# Patient Record
(demographics unavailable — no encounter records)

---

## 2024-10-21 NOTE — ASSESSMENT
[FreeTextEntry1] : 9-year-old male, left small finger Salter-Dallas type fracture of the proximal phalanx  -He is here with his mother today we discussed his injury in detail.  We discussed how it involves the physis.  I do not see the fracture on x-ray today however they have films on her phone which we are not able to transfer over there is what appears to be a Salter-Dallas II type fracture of the proximal phalanx.  This is consistent with his injury and his presentation on exam.  We discussed how physeal fractures require immobilization.  Given the minimal displacement I recommended full-time splinting, hand therapy consult placed for same-day splinting today.  Small finger to be buddied with the ring finger to improve abduction deformity.  He is a football and , I discussed the importance of avoiding reinjury as this could cause potential growth and physeal abnormalities.  Mom is understanding of plan.  They are in to follow-up next week and we will reevaluate.

## 2024-10-21 NOTE — HISTORY OF PRESENT ILLNESS
[FreeTextEntry1] : 9-year-old male presenting for evaluation left small finger injury.  He jammed it playing basketball on over the weekend.  They went to an urgent care there was concern for a proximal phalanx Salter-Dallas physeal fracture.  He has been splinted since the event.  He is here with his mother today who acts as historian.  No prior injury.

## 2024-10-21 NOTE — PHYSICAL EXAM
[de-identified] : Left small finger abducted with ecchymosis and bruising.  Tenderness over the proximal phalanx physis.  FDS FDP firing.  Unable to make a full composite fist secondary to pain and stiffness.  Remainder of the hand and digits without abnormality.  Skin intact.  No nailbed injury. [de-identified] : Three-view x-ray left small finger including AP, lateral, oblique, these were taken today and personally reviewed the demonstrate abduction deformity of the small finger, no obvious fracture, likely Salter-Dallas I type fracture

## 2024-10-29 NOTE — HISTORY OF PRESENT ILLNESS
[FreeTextEntry1] : 9-year-old male presenting for evaluation left small finger injury.  He has been doing well, he has been wearing his splint full-time other than to shower.  He is here with his mother today who aids in his history.  Pain is still present but slowly improving

## 2024-10-29 NOTE — ASSESSMENT
[FreeTextEntry1] : 9-year-old male, left small finger Salter-Dallas type 1 fracture of the proximal phalanx  -He is here with his mother today we discussed his injury in detail.  We discussed how it involves the physis.  I do not see the fracture on x-ray today however they have films on her phone which we are not able to transfer over there is what appears to be a Salter-Dallas 1-II type fracture of the proximal phalanx.  This is consistent with his injury and his presentation on exam.  We discussed how physeal fractures require immobilization.  -He has been compliant with splint.  I discussed the risk of reinjury if he is to return to sport.  Mom reports that they did play football over the weekend.  As the digit is stiff it is at risk of reinjury.  May transition to a sofi strap 2 weeks.  Follow-up in 3 weeks.  His abduction deformity has improved

## 2024-10-29 NOTE — PHYSICAL EXAM
[de-identified] : Left small finger abducted with ecchymosis and bruising.  Abduction deformity has slightly improved tenderness over the proximal phalanx physis.  FDS FDP firing.  Unable to make a full composite fist secondary to pain and stiffness.  Remainder of the hand and digits without abnormality.  Skin intact.  No nailbed injury. [de-identified] : Three-view x-ray left small finger including AP, lateral, oblique, these were taken today and personally reviewed the demonstrate improved abduction deformity of the small finger, no obvious fracture, likely Salter-Dallas I type fracture

## 2024-11-19 NOTE — ASSESSMENT
[FreeTextEntry1] : - Clinically and radiographically he has healed.  He has regained full range of motion throughout his hand and he is returned to basketball and other sports.  He is having clinically no pain his mother is here who aids in his history and agrees with the plan going forward.  Recommend he return to all sports gym and recess without restriction.  They going to follow-up as needed as needed.

## 2024-11-19 NOTE — PHYSICAL EXAM
[de-identified] : Left small finger -Abduction deformity has resolved.  He has no tenderness palpation about the entirety of the digit or hand. -He is able to make a full composite fist painlessly, there is no rotational difference compared to the contralateral side with no overlap of the digits -No swelling -Denies numbness throughout the hand [de-identified] : Three-view x-ray left small finger including AP, lateral, oblique, these were taken today and personally reviewed the demonstrat resolved deformity of the abduction, no fracture

## 2024-11-19 NOTE — HISTORY OF PRESENT ILLNESS
[FreeTextEntry1] : 9-year-old male presenting for evaluation left small finger injury.  He has been doing well.  He is with his mother today who acts as historian.  He is having no pain he has regained full range of motion he has returned to gym and sports.

## 2024-11-20 NOTE — HISTORY OF PRESENT ILLNESS
[FreeTextEntry1] : 9-year-old male presenting for evaluation of his left wrist.  He is recent seen yesterday for final follow-up for his small finger fracture.  Last evening he was playing basketball sustained a fall onto his left wrist.  They were to an urgent care there is concern for a fracture near the physis.  He has been in a brace since then.  His pain is slightly improved today however still present.

## 2024-11-20 NOTE — PHYSICAL EXAM
[de-identified] : Left hand and wrist Severe tenderness to palpation over the distal radius physis.  Mild ecchymosis.  Pain with wrist motion No instability of the DRUJ.  Able to flex and extend the wrist and all hand and digits.  Able make a full composite fist   The elbow is nontender.  He has full range of motion of the elbow and able to flex and extend painlessly no ecchymosis or swelling around the elbow  [de-identified] : Three-view x-ray left wrist including AP, lateral, oblique were taken today and personally reviewed these demonstrate slight abnormality of the distal radius physis in the region of his pain  2 view x-ray left forearm including AP and lateral were taken and personally reviewed these demonstrate no fractures of the proximal radius or ulna

## 2024-11-20 NOTE — ASSESSMENT
[FreeTextEntry1] : Concern for Salter-Dallas I/II type fracture of the distal radius given history, abnormality of the distal radius physis on x-ray as well as severe tenderness palpation in this area.  Plan to treat with 2 weeks of casting with reexamination at this point.  Short arm cast applied today without difficulty.  Detailed cast instructions given to the father  - Detailed cast instructions given to parent.  We discussed the importance of keeping the cast clean dry and intact.  We discussed if the cast is become wet they need to return immediately as this can cause skin compromise.  We discussed the importance of elevation and moving the digits for circulation.  We discussed warning signs including worsening pain, numbness, discoloration of the fingers and to elevate and call immediately if this is to occur. All questions answered to the best my ability. Cast bag recommendations given.

## 2024-12-04 NOTE — HISTORY OF PRESENT ILLNESS
[FreeTextEntry1] : Doing well, he is to be status post casting for concern for occult physeal distal radius fracture.  He is with his father today who acts as historian.  Having no pain at this time.  No issues with the cast.

## 2024-12-04 NOTE — ASSESSMENT
[FreeTextEntry1] : - He has no pain at this point 2 weeks status post casting he has dramatically improved.  He has no pain or swelling on examination today.  The cast was removed today.  I discussed with his father the plan going forward. he may slowly return to activity as tolerated, advised to monitor for symptoms or pain.  They are going to call with any questions or concerns at this point.

## 2024-12-04 NOTE — PHYSICAL EXAM
[de-identified] : Left hand and wrist Ecchymosis resolved, tenderness over the distal radius physis has completely resolved he has no pain through palpation or range of motion to the entirety of the upper extremity No instability of the DRUJ.  Able to flex and extend the wrist and all hand and digits.  Able make a full composite fist, pronation supination flexion extension equal to the contralateral side   The elbow is nontender.  He has full range of motion of the elbow and able to flex and extend painlessly no ecchymosis or swelling around the elbow  [de-identified] : Three-view x-ray left wrist including AP, lateral, oblique were taken today and personally reviewed these demonstrate no change in alignment or clear fracture line no change since prior films.